# Patient Record
Sex: FEMALE | Race: WHITE | Employment: OTHER | URBAN - METROPOLITAN AREA
[De-identification: names, ages, dates, MRNs, and addresses within clinical notes are randomized per-mention and may not be internally consistent; named-entity substitution may affect disease eponyms.]

---

## 2019-05-03 ENCOUNTER — THERAPY VISIT (OUTPATIENT)
Dept: PHYSICAL THERAPY | Facility: CLINIC | Age: 69
End: 2019-05-03
Payer: COMMERCIAL

## 2019-05-03 DIAGNOSIS — M25.511 ACUTE PAIN OF BOTH SHOULDERS: ICD-10-CM

## 2019-05-03 DIAGNOSIS — M25.512 ACUTE PAIN OF BOTH SHOULDERS: ICD-10-CM

## 2019-05-03 DIAGNOSIS — M75.121 COMPLETE TEAR OF RIGHT ROTATOR CUFF: ICD-10-CM

## 2019-05-03 DIAGNOSIS — M75.112 INCOMPLETE TEAR OF LEFT ROTATOR CUFF: ICD-10-CM

## 2019-05-03 PROCEDURE — 97161 PT EVAL LOW COMPLEX 20 MIN: CPT | Mod: GP | Performed by: PHYSICAL THERAPIST

## 2019-05-03 PROCEDURE — 97110 THERAPEUTIC EXERCISES: CPT | Mod: GP | Performed by: PHYSICAL THERAPIST

## 2019-05-03 PROCEDURE — 97112 NEUROMUSCULAR REEDUCATION: CPT | Mod: GP | Performed by: PHYSICAL THERAPIST

## 2019-05-08 PROBLEM — M25.511 ACUTE PAIN OF BOTH SHOULDERS: Status: ACTIVE | Noted: 2019-05-08

## 2019-05-08 PROBLEM — M25.512 ACUTE PAIN OF BOTH SHOULDERS: Status: ACTIVE | Noted: 2019-05-08

## 2019-05-08 PROBLEM — M75.121 COMPLETE TEAR OF RIGHT ROTATOR CUFF: Status: ACTIVE | Noted: 2019-05-08

## 2019-05-08 PROBLEM — M75.112 INCOMPLETE TEAR OF LEFT ROTATOR CUFF: Status: ACTIVE | Noted: 2019-05-08

## 2019-05-08 NOTE — PROGRESS NOTES
"[facility] Initial Evaluation  Subjective:  Pt has had shoulder pain and night pain on/off for 2 years. In January of 2019 she used her elbows to scoot herself up in bed and she was much more sore and had a lot of pain afterwards. She went to see Dr. Juan and she wanted a second opinion with Dr. Goodson and have a shoulder injection until she will be able to do a reverse if she continues to have pain. The most recent injection on the R shoulder was 3-16-19 ([facility] US-guided) and Dr. Goodson did another [facility], US-guided injection on her L on 4-26-19.) She is no longer waking up at night and can reach overhead but has weakness or \"heaviness\"when she lowers the arms.     The history is provided by the patient. No  was used.   Brooklynn Vega is a 69 year old female with a bilateral shoulders condition.  Condition occurred with:  Degenerative joint disease and repetition/overuse.  Condition occurred: for unknown reasons.  This is a new condition  4-26-19  .    Patient reports pain:  Posterior, lateral, in the joint and upper trap.  Radiates to:  Upper arm and shoulder.  Pain is described as aching and is intermittent and reported as 1/10.  Associated symptoms:  Loss of strength, loss of motion/stiffness, painful arc, catching and locking. Pain is worse during the night.  Symptoms are exacerbated by lifting, carrying, lying on extremity and using arm overhead (shampooing, drying and curling hair) and relieved by rest and heat (injection).  Since onset symptoms are gradually improving.  Special tests:  MRI and x-ray.  Previous treatment includes physical therapy.  There was no improvement following previous treatment.  General health as reported by patient is good.  Pertinent medical history includes:  High blood pressure.  Medical allergies: yes (sulfa).  Other surgeries include:  Other (cataracts).  Current medications:  High blood pressure medication and anti-depressants.  Current " occupation is Retired  .    Primary job tasks include:  Driving.    Barriers include:  None as reported by the patient.        Patient is a 69 year old female with both sides shoulder complaints.    Patient has the following significant findings with corresponding treatment plan.                Diagnosis 1:  B shoulder pain (full RCT on R and incomplete RCT on L)  Pain -  hot/cold therapy, manual therapy, self management, education, directional preference exercise and home program  Decreased ROM/flexibility - manual therapy and therapeutic exercise  Decreased joint mobility - manual therapy and therapeutic exercise  Decreased strength - therapeutic exercise and therapeutic activities  Impaired muscle performance - neuro re-education  Decreased function - therapeutic activities    Therapy Evaluation Codes:   1) History comprised of:   Personal factors that impact the plan of care:      Age.    Comorbidity factors that impact the plan of care are:      None.     Medications impacting care: Pain.  2) Examination of Body Systems comprised of:   Body structures and functions that impact the plan of care:      Shoulder.   Activity limitations that impact the plan of care are:      Bathing, Cooking, Driving, Dressing, Grasping, Lifting, Sleeping and Laying down.  3) Clinical presentation characteristics are:   Stable/Uncomplicated.  4) Decision-Making    Low complexity using standardized patient assessment instrument and/or measureable assessment of functional outcome.  Cumulative Therapy Evaluation is: Low complexity.    Previous and current functional limitations:  (See Goal Flow Sheet for this information)    Short term and Long term goals: (See Goal Flow Sheet for this information)     Communication ability:  Patient appears to be able to clearly communicate and understand verbal and written communication and follow directions correctly.  Treatment Explanation - The following has been discussed with the patient:   RX  ordered/plan of care  Anticipated outcomes  Possible risks and side effects  This patient would benefit from PT intervention to resume normal activities.   Rehab potential is good.    Frequency:  1 X week, once daily  Duration:  for 4 weeks then 2x.month for 2 months  Discharge Plan:  Achieve all LTG.  Independent in home treatment program.  Reach maximal therapeutic benefit.    Please refer to the daily flowsheet for treatment today, total treatment time and time spent performing 1:1 timed codes.                       Objective:  System                   Shoulder Evaluation:  ROM:  AROM:    Flexion:  Left:  160    Right:  150    Abduction:  Left: 162   Right:  62      External Rotation:  Left:  62    Right:  45            Extension/Internal Rotation:  Left:  T4    Right:  T3        R shoulder strength: 4/4-/4/3+  L shoulder strength: 4+/4-/4/4-    Pain 3/10 with resisted ER B    Posture assessment:  Mild forward head and rounded shoulders                                             General     ROS

## 2019-05-23 ENCOUNTER — THERAPY VISIT (OUTPATIENT)
Dept: PHYSICAL THERAPY | Facility: CLINIC | Age: 69
End: 2019-05-23
Payer: COMMERCIAL

## 2019-05-23 DIAGNOSIS — M75.112 INCOMPLETE TEAR OF LEFT ROTATOR CUFF: ICD-10-CM

## 2019-05-23 DIAGNOSIS — M25.511 ACUTE PAIN OF BOTH SHOULDERS: ICD-10-CM

## 2019-05-23 DIAGNOSIS — M75.121 COMPLETE TEAR OF RIGHT ROTATOR CUFF: ICD-10-CM

## 2019-05-23 DIAGNOSIS — M25.512 ACUTE PAIN OF BOTH SHOULDERS: ICD-10-CM

## 2019-05-23 PROCEDURE — 97110 THERAPEUTIC EXERCISES: CPT | Mod: GP | Performed by: PHYSICAL THERAPIST

## 2019-05-23 PROCEDURE — 97112 NEUROMUSCULAR REEDUCATION: CPT | Mod: GP | Performed by: PHYSICAL THERAPIST

## 2019-05-23 NOTE — PROGRESS NOTES
"Subjective:                        Objective:  System    Physical Exam    General     ROS    Assessment/Plan:    SUBJECTIVE  Subjective changes as noted by pt:  The R shoulder feels \"very heavy\" neither of her shoulders now bother her during the night which is good. She is struggling to do the supine strength (was doing 5 reps then went up to 12 but then felt a snapping and a lot of weakness and now she can only do 3).       Current pain level: 0/10     Changes in function:  Yes (See Goal flowsheet attached for changes in current functional level)     Adverse reaction to treatment or activity:  None    OBJECTIVE  Changes in objective findings:  Yes, R shoulder AAROM:    Wand ABD=160 (AROM=148)  Wand flexion standing=163          ASSESSMENT  Brooklynn continues to require intervention to meet STG and LTG's: PT  Patient's symptoms are resolving.  Patient is progressing as expected.  Response to therapy has shown an improvement in  pain level and ROM   Progress made towards STG/LTG?  Yes (See Goal flowsheet attached for updates on achievement of STG and LTG)    PLAN  Current treatment program is being advanced to more complex exercises.    PTA/ATC plan:  N/A    Please refer to the daily flowsheet for treatment today, total treatment time and time spent performing 1:1 timed codes.        "

## 2019-05-31 ENCOUNTER — THERAPY VISIT (OUTPATIENT)
Dept: PHYSICAL THERAPY | Facility: CLINIC | Age: 69
End: 2019-05-31
Payer: COMMERCIAL

## 2019-05-31 DIAGNOSIS — M75.121 COMPLETE TEAR OF RIGHT ROTATOR CUFF: ICD-10-CM

## 2019-05-31 DIAGNOSIS — M25.512 ACUTE PAIN OF BOTH SHOULDERS: ICD-10-CM

## 2019-05-31 DIAGNOSIS — M75.112 INCOMPLETE TEAR OF LEFT ROTATOR CUFF: ICD-10-CM

## 2019-05-31 DIAGNOSIS — M25.511 ACUTE PAIN OF BOTH SHOULDERS: ICD-10-CM

## 2019-05-31 PROCEDURE — 97110 THERAPEUTIC EXERCISES: CPT | Mod: GP | Performed by: PHYSICAL THERAPIST

## 2019-05-31 PROCEDURE — 97112 NEUROMUSCULAR REEDUCATION: CPT | Mod: GP | Performed by: PHYSICAL THERAPIST

## 2019-05-31 PROCEDURE — 97140 MANUAL THERAPY 1/> REGIONS: CPT | Mod: GP | Performed by: PHYSICAL THERAPIST

## 2019-05-31 NOTE — PROGRESS NOTES
"Subjective:  HPI                    Objective:  System    Physical Exam    General     ROS    Assessment/Plan:    SUBJECTIVE  Subjective changes as noted by pt:  She feels like both shoulders are improving--still a little hard to bring her R arm down (especially if she has something weighting the arm down.) It's more weakness than painful but she will occasionally        Current pain level: 0/10     Changes in function:  Yes (See Goal flowsheet attached for changes in current functional level)     Adverse reaction to treatment or activity:  None    OBJECTIVE  Changes in objective findings:  Yes, R shoulder: 165/166 and if she pulls back down, it no longer hurts during the eccentric phase.    Four corner stretching caused her shoulder to \"click\" and was a shot of pain down her arm and was more sore afterwards.         ASSESSMENT  Brooklynn continues to require intervention to meet STG and LTG's: PT  Patient's symptoms are resolving.  Patient is progressing as expected.  Response to therapy has shown an improvement in  pain level and ROM   Progress made towards STG/LTG?  Yes (See Goal flowsheet attached for updates on achievement of STG and LTG)    PLAN  Current treatment program is being advanced to more complex exercises.    PTA/ATC plan:  N/A    Please refer to the daily flowsheet for treatment today, total treatment time and time spent performing 1:1 timed codes.        "

## 2019-06-17 ENCOUNTER — THERAPY VISIT (OUTPATIENT)
Dept: PHYSICAL THERAPY | Facility: CLINIC | Age: 69
End: 2019-06-17
Payer: COMMERCIAL

## 2019-06-17 DIAGNOSIS — M25.511 ACUTE PAIN OF BOTH SHOULDERS: ICD-10-CM

## 2019-06-17 DIAGNOSIS — M75.121 NONTRAUMATIC COMPLETE TEAR OF RIGHT ROTATOR CUFF: ICD-10-CM

## 2019-06-17 DIAGNOSIS — M25.512 ACUTE PAIN OF BOTH SHOULDERS: ICD-10-CM

## 2019-06-17 DIAGNOSIS — M75.112 INCOMPLETE TEAR OF LEFT ROTATOR CUFF: ICD-10-CM

## 2019-06-17 PROCEDURE — 97110 THERAPEUTIC EXERCISES: CPT | Mod: GP | Performed by: PHYSICAL THERAPIST

## 2019-06-17 NOTE — PROGRESS NOTES
"Subjective:  HPI                    Objective:  System    Physical Exam    General     ROS    Assessment/Plan:    SUBJECTIVE  Subjective changes as noted by pt:  She still feels some \"heaviness\" in the R arm but really feeling pretty good for the most part. She feels like the ER stretch makes her more sore      Current pain level: 0/10     Changes in function:  Yes (See Goal flowsheet attached for changes in current functional level)     Adverse reaction to treatment or activity:  None    OBJECTIVE  Changes in objective findings:  Yes,     R shoulder AROM 163/167/T5/64  L shoulder AROM: 167/167/T5/80        ASSESSMENT  Brooklynn continues to require intervention to meet STG and LTG's: PT  Patient's symptoms are resolving.  Patient is progressing as expected.  Response to therapy has shown an improvement in  pain level, ROM , flexibility and strength  Progress made towards STG/LTG?  Yes (See Goal flowsheet attached for updates on achievement of STG and LTG)    PLAN  Current treatment program is being advanced to more complex exercises.    PTA/ATC plan:  N/A    Please refer to the daily flowsheet for treatment today, total treatment time and time spent performing 1:1 timed codes.        "

## 2019-06-27 ENCOUNTER — THERAPY VISIT (OUTPATIENT)
Dept: PHYSICAL THERAPY | Facility: CLINIC | Age: 69
End: 2019-06-27
Payer: COMMERCIAL

## 2019-06-27 DIAGNOSIS — M25.511 ACUTE PAIN OF BOTH SHOULDERS: ICD-10-CM

## 2019-06-27 DIAGNOSIS — M25.512 ACUTE PAIN OF BOTH SHOULDERS: ICD-10-CM

## 2019-06-27 DIAGNOSIS — M75.112 INCOMPLETE TEAR OF LEFT ROTATOR CUFF: ICD-10-CM

## 2019-06-27 DIAGNOSIS — M75.121 NONTRAUMATIC COMPLETE TEAR OF RIGHT ROTATOR CUFF: ICD-10-CM

## 2019-06-27 PROCEDURE — 97110 THERAPEUTIC EXERCISES: CPT | Mod: GP | Performed by: PHYSICAL THERAPIST

## 2019-06-27 PROCEDURE — 97112 NEUROMUSCULAR REEDUCATION: CPT | Mod: GP | Performed by: PHYSICAL THERAPIST

## 2019-06-27 NOTE — PROGRESS NOTES
Subjective:  HPI                    Objective:  System    Physical Exam    General     ROS    Assessment/Plan:      DISCHARGE REPORT    Progress reporting period is from 5-3-19 to 6-27-19.         SUBJECTIVE  Subjective changes as noted by pt:  She is getting a little more pain with stirring a pot on a stove, it's sore again when she is driving, and it's a real struggle to dry and style her hair. She reports it's just heaviness when she does overhead activities. She also states the pain is waking her at night. She does feel a lot better in her shoulder blades and the strength is a lot better.        Current pain level: 1/10     Changes in function:  Yes (See Goal flowsheet attached for changes in current functional level)     Adverse reaction to treatment or activity:  None    OBJECTIVE  Changes in objective findings:  Yes,    R shoulder AROM: 165/170/T4/60  L shoulder AROM: 165/170/T4/74    R shoulder strength: 3+/4-/5/3+  L shoulder strength: 4+/4/5/4     ASSESSMENT/PLAN  Updated problem list and treatment plan: Diagnosis 1:  B RCT    Pain -  hot/cold therapy, self management, education, directional preference exercise and home program  Decreased ROM/flexibility - manual therapy and therapeutic exercise  Decreased joint mobility - manual therapy and therapeutic exercise  Decreased strength - therapeutic exercise and therapeutic activities  Impaired muscle performance - neuro re-education  Decreased function - therapeutic activities  STG/LTGs have been met or progress has been made towards goals:  Yes (See Goal flow sheet completed today.)  Assessment of Progress: The patient's condition is improving.  The patient's condition has potential to improve.  Self Management Plans:  Patient has been instructed in a home treatment program.  Patient is independent in a home treatment program.  Patient  has been instructed in self management of symptoms.  Patient is independent in self management of symptoms.  I have  re-evaluated this patient and find that the nature, scope, duration and intensity of the therapy is appropriate for the medical condition of the patient.  Brooklynn continues to require the following intervention to meet STG and LTG's:  PT is no longer needed to meet goals.     Recommendations:  This patient is ready to be discharged from therapy and continue their home treatment program.    Please refer to the daily flowsheet for treatment today, total treatment time and time spent performing 1:1 timed codes.

## 2020-01-15 ENCOUNTER — THERAPY VISIT (OUTPATIENT)
Dept: PHYSICAL THERAPY | Facility: CLINIC | Age: 70
End: 2020-01-15
Payer: COMMERCIAL

## 2020-01-15 DIAGNOSIS — Z96.611 AFTERCARE FOLLOWING RIGHT SHOULDER JOINT REPLACEMENT SURGERY: ICD-10-CM

## 2020-01-15 DIAGNOSIS — M25.511 ACUTE PAIN OF RIGHT SHOULDER: ICD-10-CM

## 2020-01-15 DIAGNOSIS — Z47.1 AFTERCARE FOLLOWING RIGHT SHOULDER JOINT REPLACEMENT SURGERY: ICD-10-CM

## 2020-01-15 DIAGNOSIS — Z96.611 PRESENCE OF RIGHT ARTIFICIAL SHOULDER JOINT: ICD-10-CM

## 2020-01-15 PROCEDURE — 97112 NEUROMUSCULAR REEDUCATION: CPT | Mod: GP | Performed by: PHYSICAL THERAPIST

## 2020-01-15 PROCEDURE — 97161 PT EVAL LOW COMPLEX 20 MIN: CPT | Mod: GP | Performed by: PHYSICAL THERAPIST

## 2020-01-15 PROCEDURE — 97110 THERAPEUTIC EXERCISES: CPT | Mod: GP | Performed by: PHYSICAL THERAPIST

## 2020-01-15 NOTE — PROGRESS NOTES
"[facility] Initial Evaluation  Subjective:  Pt reports that she slowly started having more pain over the course of the past 6 months after her last bout of PT ending in June. She had another injection in July but it did \"absolutely nothing\" and so she opted for a RTSA on 12-18-19. She is to be in a sling x 6 weeks (she gets it off on the 29th).    The history is provided by the patient. No  was used.   Type of problem:  Right shoulder   Condition occurred with:  Degenerative joint disease.     Patient reports pain:  Anterior and upper trap. Radiates to:  Shoulder (scapular area and CT junction). Associated symptoms:  Loss of strength and loss of motion/stiffness. Symptoms are exacerbated by lifting, lying on extremity, using arm at shoulder level, using arm behind back and using arm overhead and relieved by rest.      and reported as 1/10 on pain scale. General health as reported by patient is good. Pertinent medical history includes:  Implanted device and high blood pressure.   Other medical allergies details: sulpha.    Current medications:  Anti-depressants, bone density and high blood pressure medication.     Pain is described as aching and is intermittent. Pain is the same all the time. Since onset symptoms are gradually improving. Special tests:  MRI. Previous treatment includes physical therapy. There was mild improvement following previous treatment.   Patient is retired.   Barriers include:  Transportation.  Red flags:  None as reported by patient.                      Objective:  System                   Shoulder Evaluation:  ROM:  AROM:    Flexion:  Left:  140    Right:  45 AAROM wand    Abduction:  Left: 132   Right:  35 AAROM wand      External Rotation:  Left:  60                Extension/Internal Rotation:  Left:  T12        PROM:  not assessed                                Strength:  not assessed (not tested due to post-surgical " state)                      Stability Testing:  not assessed      Special Tests:  not assessed      Palpation:      Right shoulder tenderness present at: Levator; Rhomboids and Upper Trap                                     General     ROS    Assessment/Plan:    Patient is a 69 year old female with right side shoulder complaints.    Patient has the following significant findings with corresponding treatment plan.                Diagnosis 1:  S/p R RTSA    Pain -  manual therapy, self management, education, directional preference exercise and home program  Decreased ROM/flexibility - manual therapy and therapeutic exercise  Decreased joint mobility - manual therapy and therapeutic exercise  Decreased strength - therapeutic exercise and therapeutic activities    Therapy Evaluation Codes:      1) Clinical presentation characteristics are:   Stable/Uncomplicated.  2) Decision-Making    Low complexity using standardized patient assessment instrument and/or measureable assessment of functional outcome.  Cumulative Therapy Evaluation is: Low complexity.    Previous and current functional limitations:  (See Goal Flow Sheet for this information)    Short term and Long term goals: (See Goal Flow Sheet for this information)     Communication ability:  Patient appears to be able to clearly communicate and understand verbal and written communication and follow directions correctly.  Treatment Explanation - The following has been discussed with the patient:   RX ordered/plan of care  Anticipated outcomes  Possible risks and side effects  This patient would benefit from PT intervention to resume normal activities.   Rehab potential is excellent.    Frequency:  1 X week, once daily  Duration:  for 8 weeks then 2x/month for 2 months  Discharge Plan:  Achieve all LTG.  Independent in home treatment program.  Reach maximal therapeutic benefit.    Please refer to the daily flowsheet for treatment today, total treatment time and time  spent performing 1:1 timed codes.

## 2020-01-17 PROBLEM — Z96.611 AFTERCARE FOLLOWING RIGHT SHOULDER JOINT REPLACEMENT SURGERY: Status: ACTIVE | Noted: 2020-01-17

## 2020-01-17 PROBLEM — Z47.1 AFTERCARE FOLLOWING RIGHT SHOULDER JOINT REPLACEMENT SURGERY: Status: ACTIVE | Noted: 2020-01-17

## 2020-01-17 PROBLEM — Z96.611 PRESENCE OF RIGHT ARTIFICIAL SHOULDER JOINT: Status: ACTIVE | Noted: 2020-01-17

## 2020-01-17 PROBLEM — M25.511 SHOULDER PAIN, RIGHT: Status: ACTIVE | Noted: 2020-01-17

## 2020-01-22 ENCOUNTER — THERAPY VISIT (OUTPATIENT)
Dept: PHYSICAL THERAPY | Facility: CLINIC | Age: 70
End: 2020-01-22
Payer: COMMERCIAL

## 2020-01-22 DIAGNOSIS — Z47.1 AFTERCARE FOLLOWING RIGHT SHOULDER JOINT REPLACEMENT SURGERY: ICD-10-CM

## 2020-01-22 DIAGNOSIS — Z96.611 PRESENCE OF RIGHT ARTIFICIAL SHOULDER JOINT: ICD-10-CM

## 2020-01-22 DIAGNOSIS — M25.511 ACUTE PAIN OF RIGHT SHOULDER: ICD-10-CM

## 2020-01-22 DIAGNOSIS — Z96.611 AFTERCARE FOLLOWING RIGHT SHOULDER JOINT REPLACEMENT SURGERY: ICD-10-CM

## 2020-01-22 PROCEDURE — 97110 THERAPEUTIC EXERCISES: CPT | Mod: GP | Performed by: PHYSICAL THERAPIST

## 2020-01-22 PROCEDURE — 97112 NEUROMUSCULAR REEDUCATION: CPT | Mod: GP | Performed by: PHYSICAL THERAPIST

## 2020-01-29 ENCOUNTER — THERAPY VISIT (OUTPATIENT)
Dept: PHYSICAL THERAPY | Facility: CLINIC | Age: 70
End: 2020-01-29
Payer: COMMERCIAL

## 2020-01-29 DIAGNOSIS — Z96.611 PRESENCE OF RIGHT ARTIFICIAL SHOULDER JOINT: ICD-10-CM

## 2020-01-29 DIAGNOSIS — M25.511 ACUTE PAIN OF RIGHT SHOULDER: ICD-10-CM

## 2020-01-29 DIAGNOSIS — Z47.1 AFTERCARE FOLLOWING RIGHT SHOULDER JOINT REPLACEMENT SURGERY: ICD-10-CM

## 2020-01-29 DIAGNOSIS — Z96.611 AFTERCARE FOLLOWING RIGHT SHOULDER JOINT REPLACEMENT SURGERY: ICD-10-CM

## 2020-01-29 PROCEDURE — 97110 THERAPEUTIC EXERCISES: CPT | Mod: GP | Performed by: PHYSICAL THERAPIST

## 2020-01-29 PROCEDURE — 97112 NEUROMUSCULAR REEDUCATION: CPT | Mod: GP | Performed by: PHYSICAL THERAPIST

## 2020-01-29 NOTE — PROGRESS NOTES
Subjective:  HPI                    Objective:  System    Physical Exam    General     ROS    Assessment/Plan:    SUBJECTIVE  Subjective changes as noted by pt:  In general things are feeling pretty good--her elbow is getting better.        Current pain level: 1/10     Changes in function:  Yes (See Goal flowsheet attached for changes in current functional level)     Adverse reaction to treatment or activity:  None    OBJECTIVE  Changes in objective findings:  Yes, R shoulder AAROM:    Wand flexion standing=104  ABD=84  ER on wall= 52  Ext=32        ASSESSMENT  Brooklynn continues to require intervention to meet STG and LTG's: PT  Patient's symptoms are resolving.  Patient is progressing as expected.  Response to therapy has shown an improvement in  pain level, flexibility and strength  Progress made towards STG/LTG?  Yes (See Goal flowsheet attached for updates on achievement of STG and LTG)    PLAN  Current treatment program is being advanced to more complex exercises.    PTA/ATC plan:  N/A    Please refer to the daily flowsheet for treatment today, total treatment time and time spent performing 1:1 timed codes.

## 2020-02-07 ENCOUNTER — THERAPY VISIT (OUTPATIENT)
Dept: PHYSICAL THERAPY | Facility: CLINIC | Age: 70
End: 2020-02-07
Payer: COMMERCIAL

## 2020-02-07 DIAGNOSIS — Z96.611 PRESENCE OF RIGHT ARTIFICIAL SHOULDER JOINT: ICD-10-CM

## 2020-02-07 DIAGNOSIS — Z96.611 AFTERCARE FOLLOWING RIGHT SHOULDER JOINT REPLACEMENT SURGERY: ICD-10-CM

## 2020-02-07 DIAGNOSIS — M25.511 ACUTE PAIN OF RIGHT SHOULDER: ICD-10-CM

## 2020-02-07 DIAGNOSIS — Z47.1 AFTERCARE FOLLOWING RIGHT SHOULDER JOINT REPLACEMENT SURGERY: ICD-10-CM

## 2020-02-07 PROCEDURE — 97110 THERAPEUTIC EXERCISES: CPT | Mod: GP | Performed by: PHYSICAL THERAPIST

## 2020-02-07 PROCEDURE — 97112 NEUROMUSCULAR REEDUCATION: CPT | Mod: GP | Performed by: PHYSICAL THERAPIST

## 2020-02-07 NOTE — PROGRESS NOTES
"Subjective:  HPI                    Objective:  System    Physical Exam    General     ROS    Assessment/Plan:    SUBJECTIVE  Subjective changes as noted by pt:  she is \"doing really well\" and is \"shocked at what ROM she has.\" She was able to get her hair colored on her own.        Current pain level: 1/10     Changes in function:  Yes (See Goal flowsheet attached for changes in current functional level)     Adverse reaction to treatment or activity:  None    OBJECTIVE  Changes in objective findings:  Yes,   R shoulder AAROM:    Wand flexion standing=120  Wand ABD=110  EXT=42  ER on wall= 51        ASSESSMENT  Brooklynn continues to require intervention to meet STG and LTG's: PT  Patient's symptoms are resolving.  Patient is progressing as expected.  Response to therapy has shown an improvement in  pain level and ROM   Progress made towards STG/LTG?  Yes (See Goal flowsheet attached for updates on achievement of STG and LTG)    PLAN  Current treatment program is being advanced to more complex exercises.    PTA/ATC plan:  N/A    Please refer to the daily flowsheet for treatment today, total treatment time and time spent performing 1:1 timed codes.        "

## 2020-02-12 ENCOUNTER — THERAPY VISIT (OUTPATIENT)
Dept: PHYSICAL THERAPY | Facility: CLINIC | Age: 70
End: 2020-02-12
Payer: COMMERCIAL

## 2020-02-12 DIAGNOSIS — Z96.611 PRESENCE OF RIGHT ARTIFICIAL SHOULDER JOINT: ICD-10-CM

## 2020-02-12 DIAGNOSIS — M25.511 ACUTE PAIN OF RIGHT SHOULDER: ICD-10-CM

## 2020-02-12 DIAGNOSIS — Z47.1 AFTERCARE FOLLOWING RIGHT SHOULDER JOINT REPLACEMENT SURGERY: ICD-10-CM

## 2020-02-12 DIAGNOSIS — Z96.611 AFTERCARE FOLLOWING RIGHT SHOULDER JOINT REPLACEMENT SURGERY: ICD-10-CM

## 2020-02-12 PROCEDURE — 97110 THERAPEUTIC EXERCISES: CPT | Mod: GP | Performed by: PHYSICAL THERAPIST

## 2020-02-12 NOTE — PROGRESS NOTES
Subjective:  HPI  Physical Exam                    Objective:  System    Physical Exam    General     ROS    Assessment/Plan:    SUBJECTIVE  Subjective changes as noted by pt:  she continues to do well and she can now style her hair and        Current pain level: 1/10     Changes in function:  Yes (See Goal flowsheet attached for changes in current functional level)     Adverse reaction to treatment or activity:  None    OBJECTIVE  Changes in objective findings:  Yes, R shoulder AAROM:    Wand ovnctof=760  ABD=113  EXT=45  ER=44        ASSESSMENT  Brooklynn continues to require intervention to meet STG and LTG's: PT  Patient's symptoms are resolving.  Patient is progressing as expected.  Response to therapy has shown an improvement in  pain level, ROM  and flexibility  Progress made towards STG/LTG?  Yes (See Goal flowsheet attached for updates on achievement of STG and LTG)    PLAN  Current treatment program is being advanced to more complex exercises.    PTA/ATC plan:  N/A    Please refer to the daily flowsheet for treatment today, total treatment time and time spent performing 1:1 timed codes.

## 2020-02-28 ENCOUNTER — THERAPY VISIT (OUTPATIENT)
Dept: PHYSICAL THERAPY | Facility: CLINIC | Age: 70
End: 2020-02-28
Payer: COMMERCIAL

## 2020-02-28 DIAGNOSIS — Z96.611 PRESENCE OF RIGHT ARTIFICIAL SHOULDER JOINT: ICD-10-CM

## 2020-02-28 DIAGNOSIS — Z47.1 AFTERCARE FOLLOWING RIGHT SHOULDER JOINT REPLACEMENT SURGERY: ICD-10-CM

## 2020-02-28 DIAGNOSIS — Z96.611 AFTERCARE FOLLOWING RIGHT SHOULDER JOINT REPLACEMENT SURGERY: ICD-10-CM

## 2020-02-28 DIAGNOSIS — M25.511 ACUTE PAIN OF RIGHT SHOULDER: ICD-10-CM

## 2020-02-28 PROCEDURE — 97110 THERAPEUTIC EXERCISES: CPT | Mod: GP | Performed by: PHYSICAL THERAPIST

## 2020-02-28 NOTE — PROGRESS NOTES
Subjective:  HPI  Physical Exam                    Objective:  System    Physical Exam    General     ROS    Assessment/Plan:    SUBJECTIVE  Subjective changes as noted by pt:  Doing well--feeling really good. She is sleeping well on her back and has been preparing for her move and her shoulder is tolerating that well.        Current pain level: 0/10     Changes in function:  Yes (See Goal flowsheet attached for changes in current functional level)     Adverse reaction to treatment or activity:  None    OBJECTIVE  Changes in objective findings:  Yes,   R shoulder AAROM:    Wand flexion standing=134  Wall jebrrul=035  Supine wand gmzkerm=758  ABD=127  EXT= 40  ER in supine         ASSESSMENT  Brooklynn continues to require intervention to meet STG and LTG's: PT  Patient's symptoms are resolving.  Patient is progressing as expected.  Response to therapy has shown an improvement in  pain level, flexibility and strength  Progress made towards STG/LTG?  Yes (See Goal flowsheet attached for updates on achievement of STG and LTG)    PLAN  Current treatment program is being advanced to more complex exercises.    PTA/ATC plan:  N/A    Please refer to the daily flowsheet for treatment today, total treatment time and time spent performing 1:1 timed codes.

## 2020-03-05 ENCOUNTER — THERAPY VISIT (OUTPATIENT)
Dept: PHYSICAL THERAPY | Facility: CLINIC | Age: 70
End: 2020-03-05
Payer: COMMERCIAL

## 2020-03-05 DIAGNOSIS — Z96.611 AFTERCARE FOLLOWING RIGHT SHOULDER JOINT REPLACEMENT SURGERY: ICD-10-CM

## 2020-03-05 DIAGNOSIS — Z96.611 PRESENCE OF RIGHT ARTIFICIAL SHOULDER JOINT: ICD-10-CM

## 2020-03-05 DIAGNOSIS — Z47.1 AFTERCARE FOLLOWING RIGHT SHOULDER JOINT REPLACEMENT SURGERY: ICD-10-CM

## 2020-03-05 DIAGNOSIS — M25.511 ACUTE PAIN OF RIGHT SHOULDER: ICD-10-CM

## 2020-03-05 PROCEDURE — 97110 THERAPEUTIC EXERCISES: CPT | Mod: GP | Performed by: PHYSICAL THERAPIST

## 2020-03-05 NOTE — PROGRESS NOTES
Subjective:  HPI  Physical Exam                    Objective:  System    Physical Exam    General     ROS    Assessment/Plan:    SUBJECTIVE  Subjective changes as noted by pt:  Pt reports her shoulder is doing really well but her low back is really sore and she is struggling at times to do her shoulder exercises if she needs to bend over.        Current pain level: 0/10     Changes in function:  Yes (See Goal flowsheet attached for changes in current functional level)     Adverse reaction to treatment or activity:  None    OBJECTIVE  Changes in objective findings:  Yes,     R shoulder AAROM:    Wand flexion standing=133  Wall gjnvclr=589  ABD=140  ER wand supine=55          ASSESSMENT  Brooklynn continues to require intervention to meet STG and LTG's: PT  Patient's symptoms are resolving.  Patient is progressing as expected.  Response to therapy has shown an improvement in  pain level, ROM  and flexibility  Progress made towards STG/LTG?  Yes (See Goal flowsheet attached for updates on achievement of STG and LTG)    PLAN  Current treatment program is being advanced to more complex exercises.    PTA/ATC plan:  N/A    Please refer to the daily flowsheet for treatment today, total treatment time and time spent performing 1:1 timed codes.

## 2020-03-16 ENCOUNTER — THERAPY VISIT (OUTPATIENT)
Dept: PHYSICAL THERAPY | Facility: CLINIC | Age: 70
End: 2020-03-16
Payer: COMMERCIAL

## 2020-03-16 DIAGNOSIS — M54.50 BILATERAL LOW BACK PAIN: ICD-10-CM

## 2020-03-16 PROCEDURE — 97110 THERAPEUTIC EXERCISES: CPT | Mod: GP | Performed by: PHYSICAL THERAPIST

## 2020-03-16 PROCEDURE — 97161 PT EVAL LOW COMPLEX 20 MIN: CPT | Mod: GP | Performed by: PHYSICAL THERAPIST

## 2020-03-16 NOTE — LETTER
"[facility] ATHLETIC [facility] PHYSICAL THERAPY  [address]  [place] [facility] [id number] [phone]    2020    Re: Brooklynn Vega   :   1950  MRN:  [id number]   REFERRING PHYSICIAN:   Diamond De Leon    [facility]    Date of Initial Evaluation:  3/16/2020  Visits:     Reason for Referral:  Bilateral low back pain    EVALUATION SUMMARY    .[facility] Initial [facility] -- Lumbar    Date: 2020  Brooklynn Vega is a 70 year old female with a lumbar condition.   Referral: primary care  Work mechanical stresses:  n/a  Employment status:  retired  Leisure mechanical stresses: n/a  Functional disability score (LEE ANN/STarT Back):  See flowsheet  VAS score (0-10): 1/10  Patient goals/expectations:  Manage symptoms   HISTORY:  Present symptoms: back feels \"stuck.\" L buttock pain.   Pain quality (sharp/shooting/stabbing/aching/burning/cramping):  Nerve pain    Paresthesia (yes/no):  none  Present since (onset date): 2 years (flare up two weeks ago-2020).     Symptoms (improving/unchanging/worsening):  improving.   Symptoms commenced as a result of: no event   Condition occurred in the following environment:    home   Symptoms at onset (back/thigh/leg): L buttock   Constant symptoms (back/thigh/leg): none  Intermittent symptoms (back/thigh/leg): stiffness and pain  Symptoms are made worse with the following: Always Rising and Time of day - No effect   Symptoms are made better with the following: Other - certain position, inversion board, ice  Disturbed sleep (yes/no):  no Sleeping postures (prone/sup/side R/L): supine due to R TSA in Dec 2019  Previous episodes (0/1-5/6-10/11+): 6-7 Year of first episode: two years ago  Previous history: two injections over the past two years  Previous treatments: not for this episode      Specific Questions:  Cough/Sneeze/Strain (pos/neg): neg  Bowel/Bladder " (normal/abnormal): normal  Gait (normal/abnormal): gets tired sooner than usual  Medications (nil/NSAIDS/analg/steroids/anticoag/other):  Other - Bone densisty, Anti-depressants and High blood pressure  Medical allergies:  sulphur  General health (excellent/good/fair/poor):  good  Pertinent medical history:  High blood pressure and Implanted device  Imaging (None/Xray/MRI/Other):  L3-4 spondylolisthesis (not recent image)  Recent or major surgery (yes/no):  Shoulder replacement  Night pain (yes/no): no  Accidents (yes/no): no  Unexplained weight loss (yes/no): no  Barriers at home: none  Other red flags: none  Objective:      Lumbar/SI Evaluation  Lumbar Myotomes:  normal  Lumbar Dermtomes:  normal    Sarika Lumbar Evaluation  Posture:  Sitting: fair  Lordosis: Reduced  Movement Loss:  Flexion (Flex): min and pain  Extension (EXT): min  Side Glide R (SG R): nil  Side Glide L (SG L): nil  Test Movements:  EIS: During: no effect  After: no effect  Mechanical Response: no effect  Repeat EIS: During: no effect  After: no effect  Mechanical Response: IncROM  Conclusion: derangement  Principle of Treatment:  Posture Correction: lumbar support  Extension: EIS 10-15 reps every 2-3 hrs  Assessment/Plan:    Patient is a 70 year old female with lumbar complaints.    Patient has the following significant findings with corresponding treatment plan.                Diagnosis 1:  Central symmetrical lumbar derangement  Pain -  manual therapy, self management, education, directional preference exercise and home program  Decreased ROM/flexibility - manual therapy, therapeutic exercise, therapeutic activity and home program  Inflammation - self management/home program  Decreased function - therapeutic activities and home program  Impaired posture - neuro re-education, therapeutic activities and home program  Cumulative Therapy Evaluation is: Low complexity.  Previous and current functional limitations:  (See Goal Flow Sheet for this  information)    Short term and Long term goals: (See Goal Flow Sheet for this information)     Communication ability:  Patient appears to be able to clearly communicate and understand verbal and written communication and follow directions correctly.  Treatment Explanation - The following has been discussed with the patient:   RX ordered/plan of care  Anticipated outcomes  Possible risks and side effects  This patient would benefit from PT intervention to resume normal activities.   Rehab potential is excellent.    Frequency:  2 X week, once daily  Duration:  for 3 weeks  Discharge Plan:  Achieve all LTG.  Independent in home treatment program.  Reach maximal therapeutic benefit.      Thank you for your referral.    INQUIRIES  Therapist: Gaston Barnhart DPT  [facility] PHYSICAL THERAPY  [address] [place] [address]. [address]  [facility] [address]  Phone: [phone]  Fax: [phone]

## 2020-03-16 NOTE — PROGRESS NOTES
".[facility] Initial Evaluation -- Lumbar    Date: March 16, 2020  Brooklynn Vega is a 70 year old female with a lumbar condition.   Referral: primary care  Work mechanical stresses:  n/a  Employment status:  retired  Leisure mechanical stresses: n/a  Functional disability score (LEE ANN/STarT Back):  See flowsheet  VAS score (0-10): 1/10  Patient goals/expectations:  Manage symptoms     HISTORY:    Present symptoms: back feels \"stuck.\" L buttock pain.   Pain quality (sharp/shooting/stabbing/aching/burning/cramping):  Nerve pain    Paresthesia (yes/no):  none    Present since (onset date): 2 years (flare up two weeks ago-March 2020).     Symptoms (improving/unchanging/worsening):  improving.     Symptoms commenced as a result of: no event   Condition occurred in the following environment:    home     Symptoms at onset (back/thigh/leg): L buttock   Constant symptoms (back/thigh/leg): none  Intermittent symptoms (back/thigh/leg): stiffness and pain    Symptoms are made worse with the following: Always Rising and Time of day - No effect   Symptoms are made better with the following: Other - certain position, inversion board, ice    Disturbed sleep (yes/no):  no Sleeping postures (prone/sup/side R/L): supine due to R TSA in Dec 2019    Previous episodes (0/1-5/6-10/11+): 6-7 Year of first episode: two years ago    Previous history: two injections over the past two years  Previous treatments: not for this episode      Specific Questions:  Cough/Sneeze/Strain (pos/neg): neg  Bowel/Bladder (normal/abnormal): normal  Gait (normal/abnormal): gets tired sooner than usual  Medications (nil/NSAIDS/analg/steroids/anticoag/other):  Other - Bone densisty, Anti-depressants and High blood pressure  Medical allergies:  sulphur  General health (excellent/good/fair/poor):  good  Pertinent medical history:  High blood pressure and Implanted device  Imaging (None/Xray/MRI/Other):  L3-4 spondylolisthesis (not recent " image)  Recent or major surgery (yes/no):  Shoulder replacement  Night pain (yes/no): no  Accidents (yes/no): no  Unexplained weight loss (yes/no): no  Barriers at home: none  Other red flags: none          HPI                    Objective:  System         Lumbar/SI Evaluation    Lumbar Myotomes:  normal                Lumbar Dermtomes:  normal                                                                         Sarika Lumbar Evaluation    Posture:  Sitting: fair    Lordosis: Reduced        Movement Loss:  Flexion (Flex): min and pain  Extension (EXT): min  Side Glide R (SG R): nil  Side Glide L (SG L): nil  Test Movements:    EIS: During: no effect  After: no effect  Mechanical Response: no effect  Repeat EIS: During: no effect  After: no effect  Mechanical Response: IncROM            Conclusion: derangement  Principle of Treatment:  Posture Correction: lumbar support    Extension: EIS 10-15 reps every 2-3 hrs                                           ROS    Assessment/Plan:    Patient is a 70 year old female with lumbar complaints.    Patient has the following significant findings with corresponding treatment plan.                Diagnosis 1:  Central symmetrical lumbar derangement  Pain -  manual therapy, self management, education, directional preference exercise and home program  Decreased ROM/flexibility - manual therapy, therapeutic exercise, therapeutic activity and home program  Inflammation - self management/home program  Decreased function - therapeutic activities and home program  Impaired posture - neuro re-education, therapeutic activities and home program    Cumulative Therapy Evaluation is: Low complexity.    Previous and current functional limitations:  (See Goal Flow Sheet for this information)    Short term and Long term goals: (See Goal Flow Sheet for this information)     Communication ability:  Patient appears to be able to clearly communicate and understand verbal and written  communication and follow directions correctly.  Treatment Explanation - The following has been discussed with the patient:   RX ordered/plan of care  Anticipated outcomes  Possible risks and side effects  This patient would benefit from PT intervention to resume normal activities.   Rehab potential is excellent.    Frequency:  2 X week, once daily  Duration:  for 3 weeks  Discharge Plan:  Achieve all LTG.  Independent in home treatment program.  Reach maximal therapeutic benefit.    Please refer to the daily flowsheet for treatment today, total treatment time and time spent performing 1:1 timed codes.

## 2020-04-02 ENCOUNTER — VIRTUAL VISIT (OUTPATIENT)
Dept: PHYSICAL THERAPY | Facility: CLINIC | Age: 70
End: 2020-04-02
Payer: COMMERCIAL

## 2020-04-02 DIAGNOSIS — Z96.611 AFTERCARE FOLLOWING RIGHT SHOULDER JOINT REPLACEMENT SURGERY: ICD-10-CM

## 2020-04-02 DIAGNOSIS — Z47.1 AFTERCARE FOLLOWING RIGHT SHOULDER JOINT REPLACEMENT SURGERY: ICD-10-CM

## 2020-04-02 DIAGNOSIS — M25.511 ACUTE PAIN OF RIGHT SHOULDER: ICD-10-CM

## 2020-04-02 DIAGNOSIS — Z96.611 PRESENCE OF RIGHT ARTIFICIAL SHOULDER JOINT: ICD-10-CM

## 2020-04-02 PROCEDURE — 97112 NEUROMUSCULAR REEDUCATION: CPT | Mod: 95 | Performed by: PHYSICAL THERAPIST

## 2020-04-02 PROCEDURE — 97110 THERAPEUTIC EXERCISES: CPT | Mod: 95 | Performed by: PHYSICAL THERAPIST

## 2020-04-02 NOTE — PROGRESS NOTES
"Physical Therapy Virtual Follow Up Visit      The patient has been notified of following:     \"This virtual visit will be conducted between you and your provider. We have found that certain health care needs can be provided without the need for physical presence.  This service lets us provide the care you need with a virtual visit.\"    Due to external, as well as internal [facility] management of the COVID-19 Virus, Brooklynn Vega was not seen in our clinic.  As a substitution, we implemented a virtual visit to manage this patient's condition utilizing the PTRx virtual visit platform via the patient s existing code.  The provider, Sara Murguia, reviewed the patient's chart, PTRx prescription, and spoke with the patient to determine the following telemedicine visit is appropriate and effective for the patient's care.    The following type of visit was completed:   Video Visit:  The PTRx platform uses a synchronous HIPAA compliant video stream for this patient encounter.        S: Brooklynn Vega is a 70 year old female. Connected virtually on the PTRx platform to discuss their condition/progress. They noted improvements in ROM and most of the time with pain but she is a bit achey today as a result of her recent move.  They noted ongoing pain or limitations with L shoulder pain that has been occurring from over activity.     Current pain level: 1/10--\"more of an ache than a pain\"      O: Patient demonstrated     PTRx Content from today's visit:  Exercise Name: Seated Cervical Retraction Extension With Overpressure, Sets: 1 - Reps: 10-15 DO NOT DO THE SHAKING OF THE HEAD  - Sessions: 3-4x/day (more is fine if it's helping the pain in your arm), Notes: think of this like a dump truck: back it up THEN tip it out  be sure to sit tall and use lumbar support if possible to set the stage for good spinal posture.    Exercise Name: Reverse Pendulum Supine or Sidelying, Sets: 1 - Reps: 20-30 clockwise and " "counterclockwise with your shoulder (roughly 2 min is the goal) - Sessions: perform 3x/wk (just once during the day), Notes: Just do circles, keep them the distance of a dinner plate and work hard to make it a smooth [place].     Once that is easy, you may alter speeds or size of circles, but never exceed a dinner plate diameter.     Exercise Name: Pendulum/Codmans, Sets: 1 - Reps: 20-30  - Sessions: as needed when your shoulder is painful (3-4x/day), Notes: may do this multiple times a day to decrease stiffness and pain  Exercise Name: Wand Shoulder Abduction Standing, Sets: 1 - Reps: 15  only go to 45 deg - Sessions: 2, Notes: LEAD WITH YOUR THUMB, stand tall and do no push through pain  Right arm only    hold 5 sec at stretch not pain--be sure to use your uninvolved side to push up the involved arm    lead with thumb--be mindful not to hike your shoulder towards your ear    Exercise Name: Wand Shoulder Flexion in Standing, Sets: 1 - Reps: 15 only go to 60 deg (around waist height) - Sessions: 2, Notes: LEAD WITH YOUR THUMB  Right arm only    hold 5 seconds at stretch not pain \"GO NORTH\"  lead with thumb--be mindful not to hike your shoulder towards your ear    Exercise Name: [place] Stretch Flexion, Sets: 1 - Reps: 10-15 make sure to keep your elbow straight as you step forward - Sessions: 2, Notes: hold 5-10 sec at stretch not pain  stand back, with arm straight at an arm's length away from the wall starting with the hand at shoulder level. Step into the doorway (like a lunge) to allow the armpit to come closer to the wall as you step forward.    Exercise Name: Wand Shoulder Flexion Supine, Sets: 1 - Reps: 15 (no need to do both 15 reps--may do 10 of this and the standing one) - Sessions: 2 , Notes: my start by using other arm to help instead of the stick    when you're finished, in order to lower your arm down to your side, you can bend your elbow (like a bench press) think about pushing elbow down to come " "back to your side.  Exercise Name: [place] Shoulder Extension Standing, Sets: 1 - Reps: 15 - Sessions: 2, Notes: hold 5-10 sec at stretch not pain \"GO SOUTH\"  Exercise Name: Supine Ceiling Punch, Sets: 1 - Reps: do these for 1-2 min (or until you fatigue) - Sessions: perform 3x/wk (just once during the day), Notes: do with both arms, palms facing each other  up for 2 seconds, pause then down for 4 seconds.   Exercise Name: Shoulder External Rotation Sidelying, Sets: 1 - Reps: goal is 2 min or 40-50 reps  - Sessions: perform 3x/wk (perform just once a day), Notes: be sure to pull shoulder blade down and in first  DON'T GO UP ALL THE WAY IF YOU'RE GETTING PAIN AT THE TOP    may also try drop-catch of the towel/squishy ball in this position--be sure to stabilize with the shoulder blade first (try a big and little drop)  Exercise Name: Shoulder Flexion, Sets: 1 - Reps: goal is 2 min - Sessions: perform 3x/wk (do just 1x during the day) , Notes:   BE SURE TO SQUEEZE SHOULDER BLADES DOWN AND IN FIRST TO AVOID HIKING THE SHOULDER  may stand with your back on wall to make sure your shoulder blades are doing the same thing and staying flat against the wall  Exercise Name: Shoulder Scaption Full Can, Sets: 1 - Reps: goal is 2 min - Sessions: perform 3x/wk (do just once during the day), Notes: be sure to pull shoulder blades down and back first  may try this with your back on a wall to pull your shoulder blades down and in and keep them flat against the wall as you raise your arms out in a \"V\"      perform 3x/wk  Exercise Name: Elbow Strengthening Isotonic Flexion, Sets: 2 - Reps: 10-15 - Sessions: perform 3x/wk, Notes:   start at 3-5# and work up to 10#  raise to just above 90 deg (in other words, do raise all the way up like she is doing in the video)  Exercise Name: Bent Over Rows, Sets: 2 - Reps: 10-15 - Sessions: 3x/week, Notes: be mindful to keep a more upright posture  start with 3-5# and work up to 10-15#        R " shoulder AAROM measured with goniometer on screen:    Wand flexion standing=158  ABD=149  ER in standing=60 (not measured officially)  EXT=40    A:   Patient's symptoms are resolving.  Patient is progressing as expected.  Response to therapy has shown an improvement in  pain level, ROM  and flexibility      P: Patient will continue with the exercise program assigned on their PTRx code and will add the following measures to manage their pain/condition: ice/heat combo     Current treatment program is being advanced to more complex exercises.      Virtual visit contact time    Time of service began: 9:20 AM  Time of service ended: 10:01  AM  Total Time for set up, visit, and documentation: 46 minutes    Payor: [facility] / Plan: [facility] MEDICARE ADVANTAGE / Product Type: Medicare /   .  Procedure Code/s   Therapeutic Exercise (90566): 28 minutes  Neuromuscular Re-education (45456): 10 minutes    I have reviewed the note as documented above.  This accurately captures the substance of my conversation with the patient.  Provider location: [place], [place] ([facility]/State)  Patient location: Home    ___________________________________________________

## 2020-04-06 ENCOUNTER — VIRTUAL VISIT (OUTPATIENT)
Dept: PHYSICAL THERAPY | Facility: CLINIC | Age: 70
End: 2020-04-06
Payer: COMMERCIAL

## 2020-04-06 DIAGNOSIS — M54.50 BILATERAL LOW BACK PAIN: ICD-10-CM

## 2020-04-06 PROCEDURE — 97110 THERAPEUTIC EXERCISES: CPT | Mod: 95 | Performed by: PHYSICAL THERAPIST

## 2020-04-06 PROCEDURE — 97112 NEUROMUSCULAR REEDUCATION: CPT | Mod: 95 | Performed by: PHYSICAL THERAPIST

## 2020-04-06 NOTE — PROGRESS NOTES
"Physical Therapy Virtual Follow Up Visit      The patient has been notified of following:     \"This virtual visit will be conducted between you and your provider. We have found that certain health care needs can be provided without the need for physical presence.  This service lets us provide the care you need with a virtual visit.\"    Due to external, as well as internal [facility] management of the COVID-19 Virus, Brooklynn Vega was not seen in our clinic.  As a substitution, we implemented a virtual visit to manage this patient's condition utilizing the PTRx virtual visit platform via the patient s existing code.  The provider, Gaston Barnhart, reviewed the patient's chart, PTRx prescription, and spoke with the patient to determine the following telemedicine visit is appropriate and effective for the patient's care.    The following type of visit was completed:   Video Visit:  The PTRx platform uses a synchronous HIPAA compliant video stream for this patient encounter.        S: Brooklynn Vega is a 70 year old female. Connected virtually on the PTRx platform to discuss their condition/progress. They noted improvements in pain. Feeling really good. Loves the lumbar EIS and using lumbar support. Performing EIS 7-8x/day.  They noted ongoing pain or limitations with still can get pain down leg at times depending on activity. 50% improved.        Current pain level: 0/10  Moved this past week and a little worn ut.     O: Patient demonstrated lumbar AROM flexion nil loss with PDM, extension WNL, SGIS B NE.           PTRx Content from today's visit:  Exercise Name: Standing Extension, Sets: 1 - Reps: 10-15 reps - Sessions: 6-8x/day (every 2-3 hrs), Notes: Up against a counter when possible.   Exercise Name: Posture Correction with Lumbar Roll  Exercise Name: Understanding Mechanical Diagnosis and Therapy: Centralization  Exercise Name: Body Mechanics - Half Kneel Lift  Exercise Name: Body Mechanics - Full " Squat  Exercise Name: Body Mechanics - [place]    A:   Patient is progressing as expected.  Response to therapy has shown an improvement in  pain level and ROM       P: Patient will continue with the exercise program assigned on their PTRx code and will add the following measures to manage their pain/condition:      Continue current treatment plan until patient demonstrates readiness to progress to higher level exercises.      Virtual visit contact time    Time of service began: 9:07 AM  Time of service ended: 9:39 AM  Total Time for set up, visit, and documentation: 35 minutes    Payor: [employer] / Plan: [employer] MEDICARE ADVANTAGE / Product Type: Medicare /   .  Procedure Code/s   Therapeutic Exercise (41203): 15 minutes  Neuromuscular Re-education (36784): 8 minutes  Therapeutic Activities (60415): 5 minutes    I have reviewed the note as documented above.  This accurately captures the substance of my conversation with the patient.  Provider location: [place], [place] (City/State)  Patient location:  [place] (home)    ___________________________________________________    Any subjective info about the quality of the visit, ease of use: n/a  Patient's opinion about reasonable cost for this visit n/a

## 2020-04-13 ENCOUNTER — VIRTUAL VISIT (OUTPATIENT)
Dept: PHYSICAL THERAPY | Facility: CLINIC | Age: 70
End: 2020-04-13
Payer: COMMERCIAL

## 2020-04-13 DIAGNOSIS — M54.50 BILATERAL LOW BACK PAIN: ICD-10-CM

## 2020-04-13 PROCEDURE — 97110 THERAPEUTIC EXERCISES: CPT | Mod: 95 | Performed by: PHYSICAL THERAPIST

## 2020-04-13 PROCEDURE — 97112 NEUROMUSCULAR REEDUCATION: CPT | Mod: 95 | Performed by: PHYSICAL THERAPIST

## 2020-04-13 NOTE — PROGRESS NOTES
"Physical Therapy Virtual Follow Up Visit      The patient has been notified of following:     \"This virtual visit will be conducted between you and your provider. We have found that certain health care needs can be provided without the need for physical presence.  This service lets us provide the care you need with a virtual visit.\"    Due to external, as well as internal [facility] management of the COVID-19 Virus, Brooklynn Vega was not seen in our clinic.  As a substitution, we implemented a virtual visit to manage this patient's condition utilizing the PTRx virtual visit platform via the patient s existing code.  The provider, Gaston Barnhart, reviewed the patient's chart, PTRx prescription, and spoke with the patient to determine the following telemedicine visit is appropriate and effective for the patient's care.    The following type of visit was completed:   Video Visit:  The PTRx platform uses a synchronous HIPAA compliant video stream for this patient encounter.        S: Brooklynn Vega is a 70 year old female. Connected virtually on the PTRx platform to discuss their condition/progress.  Brooklynn notices that bending and sitting without support triggers her pain. Sleeping flatter now too due to shoulder tolerating it. Performing the EIS about 3-4x/day. Overall reports that she is improving and overall about 90% to where she wants to be.     Current pain level: 0/10      O: Patient demonstrated min loss lumbar EIS, PDM with no restriction FIS, ERP on R SGIS.      PTRx Content from today's visit:  Exercise Name: Standing Extension, Sets: 1 - Reps: 10-15 reps - Sessions: 6-8x/day (every 2-3 hrs), Notes: Up against a counter when possible.   1. Continue until symptom free for 3-4 days  2. Then OK to decrease frequency in half  3. If no worse after another 3-4 days, OK to stop and only perform as needed.   Exercise Name: Posture Correction with Lumbar Roll  Exercise Name: Body Mechanics - Half Kneel " Lift  Exercise Name: Body Mechanics - Full Squat  Exercise Name: Body Mechanics - [place]  Exercise Name: MDT Stages of Recovery    A:   Patient's symptoms are resolving.  Response to therapy has shown an improvement in  pain level and ROM       P: Patient will continue with the exercise program assigned on their PTRx code and will add the following measures to manage their pain/condition: continued use of HEP, increase to full dosage     Continue current treatment plan until patient demonstrates readiness to progress to higher level exercises.      Virtual visit contact time    Time of service began: 9:22 AM  Time of service ended: 9:55 AM  Total Time for set up, visit, and documentation: 37 minutes    Payor: [employer] / Plan: [facility] MEDICARE ADVANTAGE / Product Type: Medicare /   .  Procedure Code/s   Therapeutic Exercise (79890): 15 minutes  Neuromuscular Re-education (09509): 10 minutes  Therapeutic (81219): 5 minutes    I have reviewed the note as documented above.  This accurately captures the substance of my conversation with the patient.  Provider location: [place], [place] (City/State)  Patient location: [place]

## 2020-04-15 ENCOUNTER — VIRTUAL VISIT (OUTPATIENT)
Dept: PHYSICAL THERAPY | Facility: CLINIC | Age: 70
End: 2020-04-15
Payer: COMMERCIAL

## 2020-04-15 DIAGNOSIS — Z47.1 AFTERCARE FOLLOWING RIGHT SHOULDER JOINT REPLACEMENT SURGERY: ICD-10-CM

## 2020-04-15 DIAGNOSIS — M25.511 ACUTE PAIN OF RIGHT SHOULDER: ICD-10-CM

## 2020-04-15 DIAGNOSIS — Z96.611 PRESENCE OF RIGHT ARTIFICIAL SHOULDER JOINT: ICD-10-CM

## 2020-04-15 DIAGNOSIS — Z96.611 AFTERCARE FOLLOWING RIGHT SHOULDER JOINT REPLACEMENT SURGERY: ICD-10-CM

## 2020-04-15 PROCEDURE — 97112 NEUROMUSCULAR REEDUCATION: CPT | Mod: GP | Performed by: PHYSICAL THERAPIST

## 2020-04-15 PROCEDURE — 97110 THERAPEUTIC EXERCISES: CPT | Mod: GP | Performed by: PHYSICAL THERAPIST

## 2020-04-15 NOTE — PROGRESS NOTES
"Physical Therapy Virtual Follow Up Visit      The patient has been notified of following:     \"This virtual visit will be conducted between you and your provider. We have found that certain health care needs can be provided without the need for physical presence.  This service lets us provide the care you need with a virtual visit.\"    Due to external, as well as internal [facility] management of the COVID-19 Virus, Brooklynn Vega was not seen in our clinic.  As a substitution, we implemented a virtual visit to manage this patient's condition utilizing the PTRx virtual visit platform via the patient s existing code.  The provider, Sara Murguia, reviewed the patient's chart, PTRx prescription, and spoke with the patient to determine the following telemedicine visit is appropriate and effective for the patient's care.    The following type of visit was completed:   Video Visit:  The PTRx platform uses a synchronous HIPAA compliant video stream for this patient encounter.        S: Brooklynn Vega is a 70 year old female. Connected virtually on the PTRx platform to discuss their condition/progress. They noted improvements in strength and mobility--has been able to move and set up her house without really being too sore at all.  They noted ongoing pain or limitations with hiking shoulder with ABD over 90 deg elevation  .     Current pain level: 0/10      O: Patient demonstrated R shoulder AROM after stretching, measured with goniometer on screen:    Flexion=160  ABD=138 with hiking  IR/EXT=L3     PTRx Content from today's visit:  Exercise Name: Seated Cervical Retraction Extension With Overpressure, Sets: 1 - Reps: 10-15 DO NOT DO THE SHAKING OF THE HEAD  - Sessions: 3-4x/day (more is fine if it's helping the pain in your arm), Notes: think of this like a dump truck: back it up THEN tip it out  be sure to sit tall and use lumbar support if possible to set the stage for good spinal posture.    Exercise Name: " "Reverse Pendulum Supine or Sidelying, Sets: 1 - Reps: 20-30 clockwise and counterclockwise with your shoulder (roughly 2 min is the goal) - Sessions: perform 3x/wk (just once during the day), Notes: Just do circles, keep them the distance of a dinner plate and work hard to make it a smooth Rampart.     Once that is easy, you may alter speeds or size of circles, but never exceed a dinner plate diameter.     Exercise Name: Pendulum/Codmans, Sets: 1 - Reps: 20-30  - Sessions: as needed when your shoulder is painful (3-4x/day), Notes: may do this multiple times a day to decrease stiffness and pain  Exercise Name: Wand Shoulder Abduction Standing, Sets: 1 - Reps: 15  only go to 45 deg - Sessions: 2, Notes: LEAD WITH YOUR THUMB, stand tall and do no push through pain  Right arm only    hold 5 sec at stretch not pain--be sure to use your uninvolved side to push up the involved arm    lead with thumb--be mindful not to hike your shoulder towards your ear    Exercise Name: Wand Shoulder Flexion in Standing, Sets: 1 - Reps: 15 only go to 60 deg (around waist height) - Sessions: 2, Notes: LEAD WITH YOUR THUMB  Right arm only    hold 5 seconds at stretch not pain \"GO NORTH\"  lead with thumb--be mindful not to hike your shoulder towards your ear    Exercise Name: [place] Stretch Flexion, Sets: 1 - Reps: 10-15 make sure to keep your elbow straight as you step forward - Sessions: 2, Notes: hold 5-10 sec at stretch not pain  stand back, with arm straight at an arm's length away from the wall starting with the hand at shoulder level. Step into the doorway (like a lunge) to allow the armpit to come closer to the wall as you step forward.    Exercise Name: Wand Shoulder Flexion Supine, Sets: 1 - Reps: 15 (no need to do both 15 reps--may do 10 of this and the standing one) - Sessions: 2 , Notes: my start by using other arm to help instead of the stick    when you're finished, in order to lower your arm down to your side, you can " "bend your elbow (like a bench press) think about pushing elbow down to come back to your side.  Exercise Name: [place] Shoulder Extension Standing, Sets: 1 - Reps: 15 - Sessions: 2, Notes: hold 5-10 sec at stretch not pain \"GO SOUTH\"  Exercise Name: Supine Ceiling Punch, Sets: 1 - Reps: do these for 1-2 min (or until you fatigue) - Sessions: perform 3x/wk (just once during the day), Notes: do with both arms, palms facing each other  up for 2 seconds, pause then down for 4 seconds.   Exercise Name: Shoulder External Rotation Sidelying, Sets: 1 - Reps: goal is 2 min or 40-50 reps  - Sessions: perform 3x/wk (perform just once a day), Notes: be sure to pull shoulder blade down and in first  DON'T GO UP ALL THE WAY IF YOU'RE GETTING PAIN AT THE TOP    may also try drop-catch of the towel/squishy ball in this position--be sure to stabilize with the shoulder blade first (try a big and little drop)  Exercise Name: Shoulder Flexion, Sets: 1 - Reps: goal is 2 min - Sessions: perform 3x/wk (do just 1x during the day) , Notes: BE SURE TO SQUEEZE SHOULDER BLADES DOWN AND IN FIRST TO AVOID HIKING THE SHOULDER  may stand with your back on wall to make sure your shoulder blades are doing the same thing and staying flat against the wall  Exercise Name: Shoulder Scaption Full Can, Sets: 1 - Reps: goal is 2 min - Sessions: perform 3x/wk (do just once during the day), Notes: be sure to pull shoulder blades down and back first  may try this with your back on a wall to pull your shoulder blades down and in and keep them flat against the wall as you raise your arms out in a \"V\"      perform 3x/wk  Exercise Name: Elbow Strengthening Isotonic Flexion, Sets: 2 - Reps: 10-15 - Sessions: perform 3x/wk, Notes: start at 3-5# and work up to 10#  raise to just above 90 deg (in other words, do raise all the way up like she is doing in the video)  Exercise Name: Bent Over Rows, Sets: 2 - Reps: 10-15 - Sessions: 3x/week, Notes: be mindful to keep a more " upright posture  start with 3-5# and work up to 10-15#    Exercise Name: Push-Up Plus At Wall, Sets: 1 - Reps: 1-2 min or go to fatigue (not longer than 2 min) might want to start in sets of 10 if it's really challenging - Sessions: perform 3x/wk (just once during the day), Notes: when this gets easy, you may progress to a tall counter top  make a fist to protect your wrist if painful    A:   Patient's symptoms are resolving.  Patient is progressing as expected.  Response to therapy has shown an improvement in  pain level, flexibility and strength      P: Patient will continue with the exercise program assigned on their PTRx code and will add the following measures to manage their pain/condition: added wt with strength     Current treatment program is being advanced to more complex exercises.      Virtual visit contact time    Time of service began: 9:00 AM  Time of service ended: 9:40 AM  Total Time for set up, visit, and documentation: 45 minutes    Payor: [facility] / Plan: [facility] MEDICARE ADVANTAGE / Product Type: Medicare /   .  Procedure Code/s   Therapeutic Exercise (60721): 25 minutes  Neuromuscular Re-education (71393): 13 minutes    I have reviewed the note as documented above.  This accurately captures the substance of my conversation with the patient.  Provider location: [place], [place] ([facility]/State)  Patient location: home

## 2020-05-06 ENCOUNTER — VIRTUAL VISIT (OUTPATIENT)
Dept: PHYSICAL THERAPY | Facility: CLINIC | Age: 70
End: 2020-05-06
Payer: COMMERCIAL

## 2020-05-06 DIAGNOSIS — Z96.611 PRESENCE OF RIGHT ARTIFICIAL SHOULDER JOINT: ICD-10-CM

## 2020-05-06 DIAGNOSIS — Z96.611 AFTERCARE FOLLOWING RIGHT SHOULDER JOINT REPLACEMENT SURGERY: ICD-10-CM

## 2020-05-06 DIAGNOSIS — Z47.1 AFTERCARE FOLLOWING RIGHT SHOULDER JOINT REPLACEMENT SURGERY: ICD-10-CM

## 2020-05-06 DIAGNOSIS — M25.511 ACUTE PAIN OF RIGHT SHOULDER: ICD-10-CM

## 2020-05-06 PROCEDURE — 97110 THERAPEUTIC EXERCISES: CPT | Mod: 95 | Performed by: PHYSICAL THERAPIST

## 2020-05-06 NOTE — LETTER
"[facility] PHYS[facility] THERAPY  [address]  [place] [address]  [phone]    May 7, 2020    Re: Brooklynn Vega   :   1950  MRN:  [id number]   REFERRING PHYSICIAN:   Chris Goodson    [facility] PH[facility] THERAPY    Date of Initial Evaluation:  1/15/2020  Visits:    10  Reason for Referral:     Acute pain of right shoulder  Aftercare following right shoulder joint replacement surgery  Presence of right artificial shoulder joint    Physical Therapy Virtual Follow Up Visit    DISCHARGE NOTE: 1-15-20 to 20    The patient has been notified of following:     \"This virtual visit will be conducted between you and your provider. We have found that certain health care needs can be provided without the need for physical presence.  This service lets us provide the care you need with a virtual visit.\"    Due to external, as well as internal [facility] management of the COVID-19 Virus, Brooklynn Vega was not seen in our clinic.  As a substitution, we implemented a virtual visit to manage this patient's condition utilizing the [employer] virtual visit platform via the patient s existing code.  The provider, Sara Murguia, reviewed the patient's chart, PTRx prescription, and spoke with the patient to determine the following telemedicine visit is appropriate and effective for the patient's care.    The following type of visit was completed:   Video Visit:  The [employer] platform uses a synchronous HIPAA compliant video stream for this patient encounter.    S: Brooklynn Vega is a 70 year old female. Connected virtually on the Fly[employer] platform to discuss their condition/progress. They noted improvements in ROM, strength and ADLS.  They noted ongoing pain or limitations with lots of endurance.     Current pain level: 0/10    O: Patient demonstrated R shoulder AROM after stretching measured with goniometer on screen: " 160/140/T12/45             Re: Brooklynn Vega   :   1950    PTRx Content from today's visit:  Exercise Name: Seated Cervical Retraction Extension With Overpressure, Sets: 1 - Reps: 10-15 DO NOT DO THE SHAKING OF THE HEAD  - Sessions: 3-4x/day (more is fine if it's helping the pain in your arm), Notes: think of this like a dump truck: back it up THEN tip it out  be sure to sit tall and use lumbar support if possible to set the stage for good spinal posture.    Exercise Name: Pendulum/Codmans, Sets: 1 - Reps: 20-30  - Sessions: as nee    A:   Patient's symptoms are resolving.  Patient is progressing as expected.  Patient is becoming more independent in home exercise program  Response to therapy has shown an improvement in  pain level, ROM  and flexibility    P: Patient will continue with the exercise program assigned on their PTRx code and will add the following measures to manage their pain/condition: strength and mobility progression.   Current treatment program is being advanced to more complex exercises. She will continue to work independently with her HEP and is no longer needing skilled PT to meet her goals. She has met all STG/LTG and will be discharged from PT.    Virtual visit contact time  Time of service began: 9:00 AM  Time of service ended: 9:32 AM  Total Time for set up, visit, and documentation: 40 minutes  Payor: [facility] / Plan: [facility] MEDICARE ADVANTAGE / Product Type: Medicare /   Procedure Code/s   Therapeutic Exercise (42329): 25 minutes    I have reviewed the note as documented above.  This accurately captures the substance of my conversation with the patient.  Provider location: [place], [place]  Patient location: home    Thank you for your referral.    INQUIRIES  Therapist: Sara MurguiaPT  [facility]  [address] [address]  Phone: [phone]  Fax: [phone]

## 2020-05-06 NOTE — PROGRESS NOTES
"Physical Therapy Virtual Follow Up Visit    DISCHARGE NOTE: 1-15-20 to 5-6-20    The patient has been notified of following:     \"This virtual visit will be conducted between you and your provider. We have found that certain health care needs can be provided without the need for physical presence.  This service lets us provide the care you need with a virtual visit.\"    Due to external, as well as internal [facility] management of the COVID-19 Virus, Brooklynn Vega was not seen in our clinic.  As a substitution, we implemented a virtual visit to manage this patient's condition utilizing the PTRx virtual visit platform via the patient s existing code.  The provider, Sara Murguia, reviewed the patient's chart, PTRx prescription, and spoke with the patient to determine the following telemedicine visit is appropriate and effective for the patient's care.    The following type of visit was completed:   Video Visit:  The PTRx platform uses a synchronous HIPAA compliant video stream for this patient encounter.        S: Brooklynn Vega is a 70 year old female. Connected virtually on the PTRx platform to discuss their condition/progress. They noted improvements in ROM, strength and ADLS.  They noted ongoing pain or limitations with lots of endurance.     Current pain level: 0/10      O: Patient demonstrated R shoulder AROM after stretching measured with goniometer on screen: 160/140/T12/45     PTRx Content from today's visit:  Exercise Name: Seated Cervical Retraction Extension With Overpressure, Sets: 1 - Reps: 10-15 DO NOT DO THE SHAKING OF THE HEAD  - Sessions: 3-4x/day (more is fine if it's helping the pain in your arm), Notes: think of this like a dump truck: back it up THEN tip it out  be sure to sit tall and use lumbar support if possible to set the stage for good spinal posture.    Exercise Name: Pendulum/Codmans, Sets: 1 - Reps: 20-30  - Sessions: as negemma    A:   Patient's symptoms are resolving.  Patient " is progressing as expected.  Patient is becoming more independent in home exercise program  Response to therapy has shown an improvement in  pain level, ROM  and flexibility      P: Patient will continue with the exercise program assigned on their PTRx code and will add the following measures to manage their pain/condition: strength and mobility progression.     Current treatment program is being advanced to more complex exercises. She will continue to work independently with her HEP and is no longer needing skilled PT to meet her goals. She has met all STG/LTG and will be discharged from PT.      Virtual visit contact time    Time of service began: 9:00 AM  Time of service ended: 9:32 AM  Total Time for set up, visit, and documentation: 40 minutes    Payor: [facility] / Plan: [facility] MEDICARE ADVANTAGE / Product Type: Medicare /   .  Procedure Code/s   Therapeutic Exercise (64900): 25 minutes    I have reviewed the note as documented above.  This accurately captures the substance of my conversation with the patient.  Provider location: [place], [place] ([facility]/State)  Patient location: home

## 2020-05-11 PROBLEM — M54.50 BILATERAL LOW BACK PAIN: Status: RESOLVED | Noted: 2020-03-16 | Resolved: 2020-05-11

## 2020-05-11 NOTE — PROGRESS NOTES
Discharge Note    Progress reporting period is from initial evaluation date (please see noted date below) to Apr 13, 2020.  No linked episodes      Brooklynn failed to follow up and current status is unknown.  Please see information below for last relevant information on current status.  Patient seen for   visits.    SUBJECTIVE  Subjective changes noted by patient:     .  Current pain level is  .     Previous pain level was   .   Changes in function:  Yes (See Goal flowsheet attached for changes in current functional level)  Adverse reaction to treatment or activity: None    OBJECTIVE  Changes noted in objective findings:       ASSESSMENT/PLAN      Updated problem list and treatment plan:   Pain - HEP  Decreased ROM/flexibility - HEP  Decreased function - HEP  STG/LTGs have been met or progress has been made towards goals:  Yes, please see goal flowsheet for most current information  Assessment of Progress: current status is unknown.    Last current status:     Self Management Plans:  HEP  I have re-evaluated this patient and find that the nature, scope, duration and intensity of the therapy is appropriate for the medical condition of the patient.  Brooklynn continues to require the following intervention to meet STG and LTG's:  HEP.    Recommendations:  Discharge with current home program.  Patient to follow up with MD as needed.    Please refer to the daily flowsheet for treatment today, total treatment time and time spent performing 1:1 timed codes.

## 2020-09-09 PROBLEM — Z96.611 PRESENCE OF RIGHT ARTIFICIAL SHOULDER JOINT: Status: RESOLVED | Noted: 2020-01-17 | Resolved: 2020-09-09

## 2020-09-09 PROBLEM — Z47.1 AFTERCARE FOLLOWING RIGHT SHOULDER JOINT REPLACEMENT SURGERY: Status: RESOLVED | Noted: 2020-01-17 | Resolved: 2020-09-09

## 2020-09-09 PROBLEM — M25.511 SHOULDER PAIN, RIGHT: Status: RESOLVED | Noted: 2020-01-17 | Resolved: 2020-09-09

## 2020-09-09 PROBLEM — Z96.611 AFTERCARE FOLLOWING RIGHT SHOULDER JOINT REPLACEMENT SURGERY: Status: RESOLVED | Noted: 2020-01-17 | Resolved: 2020-09-09

## 2020-12-28 ENCOUNTER — THERAPY VISIT (OUTPATIENT)
Dept: PHYSICAL THERAPY | Facility: CLINIC | Age: 70
End: 2020-12-28
Payer: COMMERCIAL

## 2020-12-28 DIAGNOSIS — M54.50 CHRONIC BILATERAL LOW BACK PAIN: ICD-10-CM

## 2020-12-28 DIAGNOSIS — G89.29 CHRONIC BILATERAL LOW BACK PAIN: ICD-10-CM

## 2020-12-28 PROCEDURE — 97161 PT EVAL LOW COMPLEX 20 MIN: CPT | Mod: GP | Performed by: PHYSICAL THERAPIST

## 2020-12-28 PROCEDURE — 97110 THERAPEUTIC EXERCISES: CPT | Mod: GP | Performed by: PHYSICAL THERAPIST

## 2020-12-28 NOTE — PROGRESS NOTES
[facility] Initial Evaluation  Subjective:  The history is provided by the patient. No  was used.   Therapist Generated HPI Evaluation  Problem details: Brooklynn returns to PT due to recurrent LBP. Was seen in spring in PT and responded well to lumbar EIS. Had reached about 90% improvement with HEP. More recently in Oct 2020 got out of bed and couldn't straighten out. Tried some exercise but it didn't target it fully like it had before. Ended up having epidural steroid injection in mid Dec and that took away most of her pain..         Type of problem:  Lumbar.    This is a recurrent condition.  Condition occurred with:  Degenerative joint disease.  Where condition occurred: for unknown reasons.  Patient reports pain:  Lumbar spine left, lumbar spine right and central lumbar spine.  Pain is described as aching and is intermittent.  Pain radiates to:  No radiation. Pain is worse in the A.M..  Since onset symptoms are rapidly improving.  Associated symptoms:  Loss of motion/stiffness. Symptoms are exacerbated by sitting  and relieved by other (injection).  Special tests included:  MRI (central stenosis and, DDD).  Previous treatment includes physical therapy. There was significant improvement following previous treatment.  Restrictions due to condition include:  Working in normal job without restrictions.  Barriers include:  None as reported by patient.    Patient Health History         Pain is reported as 1/10 on pain scale.  General health as reported by patient is good.  Pertinent medical history includes: osteoporosis and high blood pressure.   Red flags:  None as reported by patient.  Medical allergies: other (sulfa).   Surgeries include:  Other (R TSA).    Current medications:  Anti-depressants, bone density and high blood pressure medication.    Current occupation is retired.   Primary job tasks include:  Prolonged standing, prolonged sitting and driving.                                     Objective:  System         Lumbar/SI Evaluation    Lumbar Myotomes:  normal                Lumbar Dermtomes:  normal                                                                         Sarika Lumbar Evaluation    Posture:  Sitting: fair    Lordosis: Reduced        Movement Loss:  Flexion (Flex): nil  Extension (EXT): min and pain  Side [place] R (SG R): nil and pain  Side [place] L (SG L): nil and pain  Test Movements:    EIS: During: decreases  After: no better  Mechanical Response: no effect  Repeat EIS: During: decreases  After: better  Mechanical Response: IncROM            Conclusion: derangement  Principle of Treatment:  Posture Correction: lumbar support    Extension: EIS 10-15 reps every 2-3 hrs                         Sarika Thoracic Evalution:    Movement Loss:    Extension (EXT): min and mod                                ROS    Assessment/Plan:    Patient is a 70 year old female with lumbar complaints.    Patient has the following significant findings with corresponding treatment plan.                Diagnosis 1:   Central symmetrical lumbar derangement  Pain -  manual therapy, self management, education, directional preference exercise and home program  Decreased ROM/flexibility - manual therapy, therapeutic exercise, therapeutic activity and home program  Inflammation - self management/home program  Impaired muscle performance - neuro re-education and home program  Decreased function - therapeutic activities and home program  Impaired posture - neuro re-education, therapeutic activities and home program    Cumulative Therapy Evaluation is: Low complexity.    Previous and current functional limitations:  (See Goal Flow Sheet for this information)    Short term and Long term goals: (See Goal Flow Sheet for this information)     Communication ability:  Patient appears to be able to clearly communicate and understand verbal and written communication and follow directions  correctly.  Treatment Explanation - The following has been discussed with the patient:   RX ordered/plan of care  Anticipated outcomes  Possible risks and side effects  This patient would benefit from PT intervention to resume normal activities.   Rehab potential is excellent.    Frequency:  1 X week, once daily  Duration:  for 6 weeks  Discharge Plan:  Achieve all LTG.  Independent in home treatment program.  Reach maximal therapeutic benefit.    Please refer to the daily flowsheet for treatment today, total treatment time and time spent performing 1:1 timed codes.

## 2020-12-28 NOTE — LETTER
[facility]  [address] [facility] [address]  [facility] [id number] [phone]    2020    Re: Brooklynn Vega   :   1950  MRN:  [id number]   REFERRING PHYSICIAN:   Diamond De Leon    [facility] PHYSICAL [facility]    Date of Initial Evaluation:  2020  Visits:  Rxs Used: 1  Reason for Referral:  Chronic bilateral low back pain    EVALUATION SUMMARY    [facility] Initial Evaluation  Subjective:  The history is provided by the patient. No  was used.   Therapist Generated HPI Evaluation  Problem details: Brooklynn returns to PT due to recurrent LBP. Was seen in spring in PT and responded well to lumbar EIS. Had reached about 90% improvement with HEP. More recently in Oct 2020 got out of bed and couldn't straighten out. Tried some exercise but it didn't target it fully like it had before. Ended up having epidural steroid injection in mid Dec and that took away most of her pain..         Type of problem:  Lumbar.  This is a recurrent condition.  Condition occurred with:  Degenerative joint disease.  Where condition occurred: for unknown reasons.  Patient reports pain:  Lumbar spine left, lumbar spine right and central lumbar spine.  Pain is described as aching and is intermittent.  Pain radiates to:  No radiation. Pain is worse in the A.M..  Since onset symptoms are rapidly improving.  Associated symptoms:  Loss of motion/stiffness. Symptoms are exacerbated by sitting  and relieved by other (injection).  Special tests included:  MRI (central stenosis and, DDD).  Previous treatment includes physical therapy. There was significant improvement following previous treatment.  Restrictions due to condition include:  Working in normal job without restrictions.  Barriers include:  None as reported by patient.    Patient Health History  Pain is reported as 1/10 on pain  scale.  General health as reported by patient is good.  Pertinent medical history includes: osteoporosis and high blood pressure.   Red flags:  None as reported by patient.  Medical allergies: other (sulfa).   Surgeries include:  Other (R TSA).    Current medications:  Anti-depressants, bone density and high blood pressure  Re: Brooklynn Vega   :   1950         medication.    Current occupation is retired.   Primary job tasks include:  Prolonged standing, prolonged sitting and driving.      Lumbar/SI Evaluation  Lumbar Myotomes:  normal  Lumbar Dermtomes:  Normal    Sarika Lumbar Evaluation  Posture:  Sitting: fair  Lordosis: Reduced  Movement Loss:  Flexion (Flex): nil  Extension (EXT): min and pain  Side Belzoni R (SG R): nil and pain  Side Belzoni L (SG L): nil and pain  Test Movements:  EIS: During: decreases  After: no better  Mechanical Response: no effect  Repeat EIS: During: decreases  After: better  Mechanical Response: IncROM  Conclusion: derangement  Principle of Treatment:  Posture Correction: lumbar support  Extension: EIS 10-15 reps every 2-3 hrs    Sarika Thoracic Evalution:  Movement Loss:  Extension (EXT): min and mod    Assessment/Plan:    Patient is a 70 year old female with lumbar complaints.    Patient has the following significant findings with corresponding treatment plan.                Diagnosis 1:   Central symmetrical lumbar derangement  Pain -  manual therapy, self management, education, directional preference exercise and home program  Decreased ROM/flexibility - manual therapy, therapeutic exercise, therapeutic activity and home program  Inflammation - self management/home program  Impaired muscle performance - neuro re-education and home program  Decreased function - therapeutic activities and home program  Impaired posture - neuro re-education, therapeutic activities and home program    Cumulative Therapy Evaluation is: Low complexity.  Previous and current functional  limitations:  (See Goal Flow Sheet for this information)    Short term and Long term goals: (See Goal Flow Sheet for this information)   Communication ability:  Patient appears to be able to clearly communicate and understand verbal and written communication and follow directions correctly.  Treatment Explanation - The following has been discussed with the patient:   RX ordered/plan of care  Anticipated outcomes  Re: Brooklynn Vega   :   1950        Possible risks and side effects  This patient would benefit from PT intervention to resume normal activities.   Rehab potential is excellent.    Frequency:  1 X week, once daily  Duration:  for 6 weeks  Discharge Plan:  Achieve all LTG.  Independent in home treatment program.  Reach maximal therapeutic benefit.    Thank you for your referral.      INQUIRIES  Therapist:Gaston Barnhart DPT  [facility] PHYSICAL THERAPY  [address] [place] [address]. [id number]  [facility] [address]  Phone: [phone]  Fax: [phone]

## 2021-01-04 ENCOUNTER — THERAPY VISIT (OUTPATIENT)
Dept: PHYSICAL THERAPY | Facility: CLINIC | Age: 71
End: 2021-01-04
Payer: COMMERCIAL

## 2021-01-04 DIAGNOSIS — M54.50 CHRONIC BILATERAL LOW BACK PAIN: ICD-10-CM

## 2021-01-04 DIAGNOSIS — G89.29 CHRONIC BILATERAL LOW BACK PAIN: ICD-10-CM

## 2021-01-04 PROCEDURE — 97110 THERAPEUTIC EXERCISES: CPT | Mod: GP | Performed by: PHYSICAL THERAPIST

## 2021-01-04 NOTE — PROGRESS NOTES
Subjective:  HPI  Physical Exam                    Objective:  System    Physical Exam    General     ROS    Assessment/Plan:    SUBJECTIVE  Subjective changes as noted by pt:  Brooklynn feels the exercises are helpful. A little bit of pain in the R low back. The mid back pain is no longer there. Getting out of the car, sit to stand transition continues to be bothersome.        Current pain level: 1/10     Changes in function:  Yes (See Goal flowsheet attached for changes in current functional level)     Adverse reaction to treatment or activity:  None    OBJECTIVE  Changes in objective findings:  Yes, See physical exam section and/or daily flowsheet for response to repeated movements.           ASSESSMENT  Brooklynn continues to require intervention to meet STG and LTG's: PT  Patient is progressing as expected.  Response to therapy has shown an improvement in  pain level  Progress made towards STG/LTG?  Yes (See Goal flowsheet attached for updates on achievement of STG and LTG)    PLAN  Continue current treatment plan until patient demonstrates readiness to progress to higher level exercises.    PTA/ATC plan:  N/A    Please refer to the daily flowsheet for treatment today, total treatment time and time spent performing 1:1 timed codes.

## 2021-01-11 ENCOUNTER — THERAPY VISIT (OUTPATIENT)
Dept: PHYSICAL THERAPY | Facility: CLINIC | Age: 71
End: 2021-01-11
Payer: COMMERCIAL

## 2021-01-11 DIAGNOSIS — G89.29 CHRONIC BILATERAL LOW BACK PAIN: ICD-10-CM

## 2021-01-11 DIAGNOSIS — M54.50 CHRONIC BILATERAL LOW BACK PAIN: ICD-10-CM

## 2021-01-11 PROCEDURE — 97110 THERAPEUTIC EXERCISES: CPT | Mod: GP | Performed by: PHYSICAL THERAPIST

## 2021-01-11 NOTE — PROGRESS NOTES
Subjective:  HPI  Physical Exam                    Objective:  System         Lumbar/SI Evaluation    Lumbar Myotomes:    T12-L3 (Hip Flex):  Left: 4    Right: 4  L2-4 (Quads):  Left:  5    Right:  5  L4 (Ankle DF):  Left:  5    Right:  5  L5 (Great Toe Ext): Left: 5    Right: 5                                                                    Sarika Lumbar Evaluation      Movement Loss:  Flexion (Flex): nil  Extension (EXT): nil  Side Glide R (SG R): nil  Side Kennan L (SG L): nil                                               ROS    Assessment/Plan:    DISCHARGE REPORT    Progress reporting period is from 12/28/20 to 1/11/21.       SUBJECTIVE  Subjective changes noted by patient:  Brooklynn reports that she is doing great. Noticing that she's forgetting sometimes to do exercises because it doesn't hurt. Can sit up to an hour. Feels 90% to where she wants to be.          Current pain level is 0/10  .     Previous pain level was  1/10  .   Changes in function:  Yes (See Goal flowsheet attached for changes in current functional level)  Adverse reaction to treatment or activity: None    OBJECTIVE  Changes noted in objective findings:  Yes, See physical exam section and/or daily flowsheet for response to repeated movements.           ASSESSMENT/PLAN  Updated problem list and treatment plan: Diagnosis 1:   Central symmetrical lumbar derangement    Pain -  self management, education, directional preference exercise and home program  Decreased ROM/flexibility - manual therapy, therapeutic exercise, therapeutic activity and home program  Decreased joint mobility - manual therapy, therapeutic exercise, therapeutic activity and home program  Decreased strength - therapeutic exercise, therapeutic activities and home program  STG/LTGs have been met or progress has been made towards goals:  Yes (See Goal flow sheet completed today.)  Assessment of Progress: Patient is meeting short term goals and is progressing towards long term  goals.  Self Management Plans:  Patient is independent in a home treatment program.  Patient is independent in self management of symptoms.  I have re-evaluated this patient and find that the nature, scope, duration and intensity of the therapy is appropriate for the medical condition of the patient.  Brooklynn continues to require the following intervention to meet STG and LTG's:  PT intervention is no longer required to meet STG/LTG.    Recommendations:  This patient is ready to be discharged from therapy and continue their home treatment program.    Please refer to the daily flowsheet for treatment today, total treatment time and time spent performing 1:1 timed codes.

## 2021-01-11 NOTE — LETTER
[facility] PHYSICAL THERAPY  [address] [facility]  [facility] [id number] [phone]    2021    Re: Brooklynn Vega   :   1950  MRN:  [id number]   REFERRING PHYSICIAN:   Diamond De Leon    [facility]    Date of Initial Evaluation:  2021  Visits:  Rxs Used: 3  Reason for Referral:  Chronic bilateral low back pain     Lumbar/SI Evaluation  Lumbar Myotomes:    T12-L3 (Hip Flex):  Left: 4    Right: 4  L2-4 (Quads):  Left:  5    Right:  5  L4 (Ankle DF):  Left:  5    Right:  5  L5 (Great Toe Ext): Left: 5    Right: 5     Sarika Lumbar Evaluation  Movement Loss:  Flexion (Flex): nil  Extension (EXT): nil  Side Glide R (SG R): nil  Side Glide L (SG L): nil    DISCHARGE REPORT    Progress reporting period is from 20 to 21.       SUBJECTIVE  Subjective changes noted by patient:  Brooklynn reports that she is doing great. Noticing that she's forgetting sometimes to do exercises because it doesn't hurt. Can sit up to an hour. Feels 90% to where she wants to be.       Current pain level is 0/10  .     Previous pain level was  1/10  .   Changes in function:  Yes (See Goal flowsheet attached for changes in current functional level)  Adverse reaction to treatment or activity: None    OBJECTIVE  Changes noted in objective findings:  Yes, See physical exam section and/or daily flowsheet for response to repeated movements.     ASSESSMENT/PLAN  Updated problem list and treatment plan: Diagnosis 1:   Central symmetrical lumbar derangement    Pain -  self management, education, directional preference exercise and  Re: Brooklynn Vega   :   1950        home program  Decreased ROM/flexibility - manual therapy, therapeutic exercise, therapeutic activity and home program  Decreased joint mobility - manual therapy, therapeutic exercise, therapeutic activity and home program  Decreased strength -  therapeutic exercise, therapeutic activities and home program  STG/LTGs have been met or progress has been made towards goals:  Yes (See Goal flow sheet completed today.)  Assessment of Progress: Patient is meeting short term goals and is progressing towards long term goals.  Self Management Plans:  Patient is independent in a home treatment program.  Patient is independent in self management of symptoms.  I have re-evaluated this patient and find that the nature, scope, duration and intensity of the therapy is appropriate for the medical condition of the patient.  Brooklynn continues to require the following intervention to meet STG and LTG's:  PT intervention is no longer required to meet STG/LTG.    Recommendations:  This patient is ready to be discharged from therapy and continue their home treatment program.    Thank you for your referral.      INQUIRIES  Therapist: Gaston Barnhart DPT   [facility] PHYSICAL THERAPY  [address] [place] [address]. [address]  [facility] [address]  Phone: [phone]  Fax: [phone]

## 2022-09-29 ENCOUNTER — TRANSCRIBE ORDERS (OUTPATIENT)
Dept: OTHER | Age: 72
End: 2022-09-29

## 2022-09-29 DIAGNOSIS — M25.561 RIGHT KNEE PAIN: Primary | ICD-10-CM

## 2025-04-26 NOTE — PROGRESS NOTES
"Subjective:  HPI                    Objective:  System    Physical Exam    General     ROS    Assessment/Plan:    SUBJECTIVE  Subjective changes as noted by pt:  Overall pt is doing well. She feels less pain in the shoulder and she loves that she is able to move now. Her pain at night on her R is 0/10 and now that she is using \"smooth gator\" she is feeling better on her R.        Current pain level: 1/10     Changes in function:  Yes (See Goal flowsheet attached for changes in current functional level)     Adverse reaction to treatment or activity:  None    OBJECTIVE  Changes in objective findings:  Yes, R shoulder AAROM:    Wand flexion standing=76  ABD=68        ASSESSMENT  Brooklynn continues to require intervention to meet STG and LTG's: PT  Patient's symptoms are resolving.  Patient is progressing as expected.  Response to therapy has shown an improvement in  pain level  Progress made towards STG/LTG?  Yes (See Goal flowsheet attached for updates on achievement of STG and LTG)    PLAN  Current treatment program is being advanced to more complex exercises.    PTA/ATC plan:  N/A    Please refer to the daily flowsheet for treatment today, total treatment time and time spent performing 1:1 timed codes.        " [Joint Pain] : joint pain [Negative] : Heme/Lymph